# Patient Record
Sex: FEMALE | Race: WHITE | NOT HISPANIC OR LATINO | Employment: OTHER | URBAN - METROPOLITAN AREA
[De-identification: names, ages, dates, MRNs, and addresses within clinical notes are randomized per-mention and may not be internally consistent; named-entity substitution may affect disease eponyms.]

---
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
## 2025-04-10 ENCOUNTER — HOSPITAL ENCOUNTER (OUTPATIENT)
Facility: HOSPITAL | Age: 20
Discharge: HOME | End: 2025-04-10
Attending: EMERGENCY MEDICINE | Admitting: EMERGENCY MEDICINE
Payer: MEDICAID

## 2025-04-10 VITALS
HEART RATE: 79 BPM | HEIGHT: 60 IN | TEMPERATURE: 98.4 F | RESPIRATION RATE: 20 BRPM | BODY MASS INDEX: 21.99 KG/M2 | OXYGEN SATURATION: 100 % | SYSTOLIC BLOOD PRESSURE: 112 MMHG | DIASTOLIC BLOOD PRESSURE: 51 MMHG | WEIGHT: 112 LBS

## 2025-04-10 DIAGNOSIS — H66.001 NON-RECURRENT ACUTE SUPPURATIVE OTITIS MEDIA OF RIGHT EAR WITHOUT SPONTANEOUS RUPTURE OF TYMPANIC MEMBRANE: Primary | ICD-10-CM

## 2025-04-10 LAB
B-HCG UR QL: NEGATIVE
BILIRUB UR QL STRIP: NEGATIVE
CLARITY UR: CLEAR
COLOR UR: YELLOW
FLUAV SUBTYP SPEC NAA+PROBE: NOT DETECTED
FLUBV RNA ISLT QL NAA+PROBE: NOT DETECTED
GLUCOSE UR STRIP-MCNC: NEGATIVE MG/DL
HGB UR QL STRIP.AUTO: NEGATIVE
KETONES UR QL STRIP: NEGATIVE
LEUKOCYTE ESTERASE UR QL STRIP.AUTO: NEGATIVE
NITRITE UR QL STRIP: NEGATIVE
PH UR STRIP.AUTO: 5.5 [PH] (ref 5–8)
PROT UR QL STRIP: NEGATIVE
SARS-COV-2 RNA RESP QL NAA+PROBE: NOT DETECTED
SP GR UR STRIP: >=1.03 (ref 1–1.03)
STREP A PCR: NOT DETECTED
UROBILINOGEN UR QL STRIP: NORMAL

## 2025-04-10 PROCEDURE — G0463 HOSPITAL OUTPT CLINIC VISIT: HCPCS

## 2025-04-10 PROCEDURE — 87636 SARSCOV2 & INF A&B AMP PRB: CPT | Performed by: EMERGENCY MEDICINE

## 2025-04-10 PROCEDURE — 81003 URINALYSIS AUTO W/O SCOPE: CPT | Performed by: EMERGENCY MEDICINE

## 2025-04-10 PROCEDURE — 87651 STREP A DNA AMP PROBE: CPT | Performed by: EMERGENCY MEDICINE

## 2025-04-10 PROCEDURE — 99213 OFFICE O/P EST LOW 20 MIN: CPT

## 2025-04-10 PROCEDURE — 81025 URINE PREGNANCY TEST: CPT | Performed by: EMERGENCY MEDICINE

## 2025-04-10 RX ORDER — AMOXICILLIN 400 MG/5ML
875 POWDER, FOR SUSPENSION ORAL 2 TIMES DAILY
Qty: 109 ML | Refills: 0 | Status: SHIPPED | OUTPATIENT
Start: 2025-04-10 | End: 2025-04-15

## 2025-04-10 RX ORDER — AMOXICILLIN 875 MG/1
875 TABLET, COATED ORAL 2 TIMES DAILY
Qty: 10 TABLET | Refills: 0 | Status: SHIPPED | OUTPATIENT
Start: 2025-04-10 | End: 2025-04-10

## 2025-04-10 NOTE — Clinical Note
[place] [address] [address] [place] [address] [place] [address]  Phone: [phone]    Marquita Jauregui accompanied Daniela Jauregui to the emergency department on 4/10/2025. They may return to work on 04/11/2025.        Thank you for choosing [facility].    Carolyn Yang APRN

## 2025-04-10 NOTE — FSED PROVIDER NOTE
[facility]    EMERGENCY DEPARTMENT ENCOUNTER    Room Number:  10/10  Date seen:  4/10/2025  Time seen: 11:57 EDT  PCP: Brie Montoya MD  Historian: Patients mother    HPI:  Chief complaint: Sore throat  Context:Daniela Jauregui is a 19 y.o. female who presents to the ED with c/o sore throat.  Patient's mother reports that the patient has been complaining of a sore throat since last night.  She reports that she was running a low-grade fever last night and was given Tylenol with resolution.  She also reports that the patient has been having urinary frequency but reports that she does also drink a lot of water which is normal for her.  Patient's mother reports that she did complain of some dysuria but reports the patient does have Down syndrome and she is unsure if the patient truly has dysuria or not.  She reports the patient is been eating drink without difficulty.  She also reports the patient has been complaining of right ear pain.  Patient is nontoxic in appearance    Timing: Constant  Duration: Last night  Location: Right ear  Intensity/Severity: Moderate  Associated Symptoms: Fever, sore with    ALLERGIES  Patient has no known allergies.    PAST MEDICAL HISTORY  Active Ambulatory Problems     Diagnosis Date Noted    No Active Ambulatory Problems     Resolved Ambulatory Problems     Diagnosis Date Noted    No Resolved Ambulatory Problems     No Additional Past Medical History       PAST SURGICAL HISTORY  History reviewed. No pertinent surgical history.    FAMILY HISTORY  History reviewed. No pertinent family history.    SOCIAL HISTORY  Social History     Socioeconomic History    Marital status: Single       REVIEW OF SYSTEMS  Review of Systems    All systems reviewed and negative except for those discussed in HPI.     PHYSICAL EXAM    I have reviewed the triage vital signs and nursing notes.    ED Triage Vitals [04/10/25 1033]   Temp Heart Rate Resp BP SpO2   98.4 °F  (36.9 °C) 79 20 112/51 100 %      Temp src Heart Rate Source Patient Position BP Location FiO2 (%)   Oral Monitor Sitting Right arm --       Physical Exam  Constitutional:       Appearance: Normal appearance. She is well-developed. She is not toxic-appearing.   HENT:      Right Ear: No swelling or tenderness. A middle ear effusion is present. No mastoid tenderness. Tympanic membrane is erythematous and bulging. Tympanic membrane is not perforated.      Left Ear: Tympanic membrane and ear canal normal.      Nose: No congestion or rhinorrhea.      Mouth/Throat:      Mouth: Mucous membranes are moist.      Pharynx: Oropharynx is clear. Uvula midline. No posterior oropharyngeal erythema.      Tonsils: No tonsillar exudate or tonsillar abscesses.   Eyes:      Extraocular Movements: Extraocular movements intact.      Conjunctiva/sclera: Conjunctivae normal.      Pupils: Pupils are equal, round, and reactive to light.   Cardiovascular:      Rate and Rhythm: Normal rate and regular rhythm.      Pulses: Normal pulses.      Heart sounds: Normal heart sounds.   Pulmonary:      Effort: Pulmonary effort is normal.      Breath sounds: Normal breath sounds.   Abdominal:      Tenderness: There is no right CVA tenderness or left CVA tenderness.   Musculoskeletal:         General: Normal range of motion.      Cervical back: Normal range of motion.   Skin:     General: Skin is warm.   Neurological:      General: No focal deficit present.      Mental Status: She is alert.   Psychiatric:         Mood and Affect: Mood normal.         Behavior: Behavior normal.         Vital signs and nursing notes reviewed.        LAB RESULTS  Recent Results (from the past 24 hours)   Rapid Strep A Screen - Swab, Throat    Collection Time: 04/10/25 10:36 AM    Specimen: Throat; Swab   Result Value Ref Range    STREP A PCR Not Detected Not Detected   COVID-19 and FLU A/B PCR, 1 HR TAT - Swab, Nasopharynx    Collection Time: 04/10/25 10:36 AM    Specimen:  Nasopharynx; Swab   Result Value Ref Range    COVID19 Not Detected Not Detected - Ref. Range    Influenza A PCR Not Detected Not Detected    Influenza B PCR Not Detected Not Detected   Pregnancy, Urine - Urine, Clean Catch    Collection Time: 04/10/25 10:38 AM    Specimen: Urine, Clean Catch   Result Value Ref Range    HCG, Urine QL Negative Negative   Urinalysis With Culture If Indicated - Urine, Clean Catch    Collection Time: 04/10/25 10:38 AM    Specimen: Urine, Clean Catch   Result Value Ref Range    Color, UA Yellow Yellow, Straw    Appearance, UA Clear Clear    pH, UA 5.5 5.0 - 8.0    Specific Gravity, UA >=1.030 1.005 - 1.030    Glucose, UA Negative Negative    Ketones, UA Negative Negative    Bilirubin, UA Negative Negative    Blood, UA Negative Negative    Protein, UA Negative Negative    Leuk Esterase, UA Negative Negative    Nitrite, UA Negative Negative    Urobilinogen, UA 0.2 E.U./dL 0.2 - 1.0 E.U./dL       Ordered the above labs and independently reviewed the results.      RADIOLOGY RESULTS  No Radiology Exams Resulted Within Past 24 Hours       I ordered the above noted radiological studies. Independently reviewed by me and discussed with radiologist.  See dictation above for official radiology interpretation.      Orders placed during this visit:  Orders Placed This Encounter   Procedures    Rapid Strep A Screen - Swab, Throat    COVID-19 and FLU A/B PCR, 1 HR TAT - Swab, Nasopharynx    Pregnancy, Urine - Urine, Clean Catch    Urinalysis With Culture If Indicated - Urine, Clean Catch           PROCEDURES    Procedures        MEDICATIONS GIVEN IN ER    Medications - No data to display      PROGRESS, DATA ANALYSIS, CONSULTS, AND MEDICAL DECISION MAKING    All labs and radiology studies have been independently reviewed by me.          AS OF 12:01 EDT VITALS:    BP - 112/51  HR - 79  TEMP - 98.4 °F (36.9 °C) (Oral)  02 SATS - 100%    Medical Decision Making  Patient is a 19-year-old female who presents  today with right ear pain. Exam and history most consistent with AOM. I have a low suspicion at this time for mastoiditis, malignant otitis externa, herpes or [place]z hunt syndrome, or retained foreign body.  Patient's urinalysis was negative for signs of infection.  Recommend follow-up with her primary care provider.  They were given return precautions with understanding    Problems Addressed:  Non-recurrent acute suppurative otitis media of right ear without spontaneous rupture of tympanic membrane: complicated acute illness or injury    Risk  Prescription drug management.          DIAGNOSIS  Final diagnoses:   Non-recurrent acute suppurative otitis media of right ear without spontaneous rupture of tympanic membrane       New Medications Ordered This Visit   Medications    amoxicillin (AMOXIL) 400 MG/5ML suspension     Sig: Take 10.9 mL by mouth 2 (Two) Times a Day for 5 days.     Dispense:  109 mL     Refill:  0           I performed hand hygiene on entry into the pt room and upon exit.      Part of this note may be an electronic transcription/translation of spoken language to printed text using the Dragon Dictation System.     Appropriate PPE worn during exam.    Dictated utilizing Made[employer]on dictation     Note Disclaimer: At [facility], we believe that sharing information builds trust and better relationships. You are receiving this note because you recently visited [facility]. It is possible you will see health information before a provider has talked with you about it. This kind of information can be easy to misunderstand. To help you fully understand what it means for your health, we urge you to discuss this note with your provider.

## 2025-04-10 NOTE — DISCHARGE INSTRUCTIONS
Take the prescribed antibiotic medicine you are given as directed until it is gone. Take it even if you feel better. It treats the infection and stops it from returning. Not taking all the medicine can make future infections hard to treat.  As needed for discomfort.  Please follow-up with her primary care provider.  Return to the emergency room for any new or worsening symptoms.